# Patient Record
Sex: FEMALE | Race: BLACK OR AFRICAN AMERICAN | Employment: UNEMPLOYED | URBAN - METROPOLITAN AREA
[De-identification: names, ages, dates, MRNs, and addresses within clinical notes are randomized per-mention and may not be internally consistent; named-entity substitution may affect disease eponyms.]

---

## 2017-02-10 ENCOUNTER — HOSPITAL ENCOUNTER (OUTPATIENT)
Dept: PREADMISSION TESTING | Age: 42
Discharge: HOME | End: 2017-02-10
Attending: ORTHOPAEDIC SURGERY
Payer: MEDICAID

## 2017-02-10 DIAGNOSIS — Z01.812 BLOOD TESTS PRIOR TO TREATMENT OR PROCEDURE: ICD-10-CM

## 2017-02-10 LAB
ALBUMIN SERPL BCP-MCNC: 3.5 G/DL (ref 3.4–5)
ALBUMIN/GLOB SERPL: 1.1 {RATIO} (ref 0.8–1.7)
ALP SERPL-CCNC: 44 U/L (ref 45–117)
ALT SERPL-CCNC: 14 U/L (ref 13–56)
ANION GAP BLD CALC-SCNC: 7 MMOL/L (ref 3–18)
AST SERPL W P-5'-P-CCNC: 8 U/L (ref 15–37)
BASOPHILS # BLD AUTO: 0 K/UL (ref 0–0.06)
BASOPHILS # BLD: 0 % (ref 0–2)
BILIRUB SERPL-MCNC: 0.4 MG/DL (ref 0.2–1)
BUN SERPL-MCNC: 6 MG/DL (ref 7–18)
BUN/CREAT SERPL: 8 (ref 12–20)
CALCIUM SERPL-MCNC: 8.8 MG/DL (ref 8.5–10.1)
CHLORIDE SERPL-SCNC: 109 MMOL/L (ref 100–108)
CO2 SERPL-SCNC: 29 MMOL/L (ref 21–32)
CREAT SERPL-MCNC: 0.72 MG/DL (ref 0.6–1.3)
DIFFERENTIAL METHOD BLD: ABNORMAL
EOSINOPHIL # BLD: 0.1 K/UL (ref 0–0.4)
EOSINOPHIL NFR BLD: 1 % (ref 0–5)
ERYTHROCYTE [DISTWIDTH] IN BLOOD BY AUTOMATED COUNT: 14.6 % (ref 11.6–14.5)
GLOBULIN SER CALC-MCNC: 3.2 G/DL (ref 2–4)
GLUCOSE SERPL-MCNC: 68 MG/DL (ref 74–99)
HCT VFR BLD AUTO: 39.3 % (ref 35–45)
HGB BLD-MCNC: 12.3 G/DL (ref 12–16)
LYMPHOCYTES # BLD AUTO: 37 % (ref 21–52)
LYMPHOCYTES # BLD: 1.9 K/UL (ref 0.9–3.6)
MCH RBC QN AUTO: 28.5 PG (ref 24–34)
MCHC RBC AUTO-ENTMCNC: 31.3 G/DL (ref 31–37)
MCV RBC AUTO: 91.2 FL (ref 74–97)
MONOCYTES # BLD: 0.4 K/UL (ref 0.05–1.2)
MONOCYTES NFR BLD AUTO: 8 % (ref 3–10)
NEUTS SEG # BLD: 2.7 K/UL (ref 1.8–8)
NEUTS SEG NFR BLD AUTO: 54 % (ref 40–73)
PLATELET # BLD AUTO: 237 K/UL (ref 135–420)
PMV BLD AUTO: 12.1 FL (ref 9.2–11.8)
POTASSIUM SERPL-SCNC: 4.2 MMOL/L (ref 3.5–5.5)
PROT SERPL-MCNC: 6.7 G/DL (ref 6.4–8.2)
RBC # BLD AUTO: 4.31 M/UL (ref 4.2–5.3)
SODIUM SERPL-SCNC: 145 MMOL/L (ref 136–145)
WBC # BLD AUTO: 5 K/UL (ref 4.6–13.2)

## 2017-02-10 PROCEDURE — 80053 COMPREHEN METABOLIC PANEL: CPT | Performed by: ORTHOPAEDIC SURGERY

## 2017-02-10 PROCEDURE — 36415 COLL VENOUS BLD VENIPUNCTURE: CPT | Performed by: ORTHOPAEDIC SURGERY

## 2017-02-10 PROCEDURE — 85025 COMPLETE CBC W/AUTO DIFF WBC: CPT | Performed by: ORTHOPAEDIC SURGERY

## 2017-02-21 NOTE — H&P
[address] [facility] [address]  History and Physical Exam    Patient: Brian Liang MRN: [id number]  SSN: [id number]    YOB: 1975  Age: 39 y.o. Sex: female      Subjective:      Chief Complaint: right knee pain    History of Present Illness:  Patient complains of right knee pain and difficulty ambulating. Patient has a right knee replacement that was done by Dr. Anirudh Torres in 2016. Following the knee replacement she has an injury where she hit a car door. Since the injury she has experienced reproducible clicking in her knee. A CT scan and ultrasound showed a partial tear of her quad tendon. Physical therapy has offered no relief. Past Medical History   Diagnosis Date    Arthritis      Past Surgical History   Procedure Laterality Date    Hx orthopaedic  2016     right knee replacment    Hx orthopaedic       multiple right knee arthroscopy    Hx orthopaedic       acl repair-right    Hx gyn        X 2    Hx gastric bypass  2014     gastric sleeve    Hx hernia repair  2015     hernia repair     Social History     Occupational History    Not on file. Social History Main Topics    Smoking status: Current Every Day Smoker     Packs/day: 0.50    Smokeless tobacco: Not on file      Comment: Instructed not to smoke 24 hours prior to surgry    Alcohol use 0.6 oz/week     1 Shots of liquor per week      Comment: rarely    Drug use: No    Sexual activity: Not on file     Prior to Admission medications    Medication Sig Start Date End Date Taking? Authorizing Provider   cyanocobalamin (VITAMIN B-12) 1,000 mcg sublingual tablet Take 1,000 mcg by mouth daily. Indications: PREVENTION OF VITAMIN B12 DEFICIENCY    Historical Provider   naproxen (NAPROSYN) 500 mg tablet Take 500 mg by mouth two (2) times daily as needed. Indications: Pain    Historical Provider   medroxyPROGESTERone (PROVERA) 10 mg tablet Take 30 mg by mouth daily.  Indications: ABNORMAL UTERINE BLEEDING DUE TO HORMONAL IMBALANCE    Historical Provider   POTASSIUM CHLORIDE SR 10 MEQ TAB Take 1 Tab by mouth daily. Historical Provider       Allergies: Allergies   Allergen Reactions    Latex Other (comments)     Condom, latex glove causes yeast infection        Review of Systems:  A comprehensive review of systems was negative. Objective:       Physical Exam:  HEENT: Normocephalic, atraumatic  Lungs:  Clear to auscultation  Heart:   Regular rate and rhythm  Abdomen: Soft  Extremities:  Pain and click noted with range of motion of the right knee at the superior lateral aspect of the patella. No subluxation of the patella noted. Neurological: Grossly neurovascularly intact    Assessment:      Synovitis of right total knee. Plan:       The patient has failed previous efforts of conservative management to include non-steroidal anti-inflammatory medications and viscosupplementation. Due to the fact that conservative efforts failed, the patient became a candidate for surgical intervention. Proceed with scheduled right knee arthroscopic synovectomy and scar tissue debridement. The various methods of treatment have been discussed with the patient and family. After consideration of risks, benefits, and other options for treatment, the patient has consented to surgical interventions. Questions were answered and preoperative teaching was done by Dr. Lizet Akins. Signed By: Jet Sy PA-C     February 21, 2017    Date of Surgery Update:  Erika Bautista was seen and examined. History and physical has been reviewed. The patient has been examined. There have been no significant clinical changes since the completion of the originally dated History and Physical.  Patient identified by surgeon; surgical site was confirmed by patient and surgeon.     Signed By: Radha Ace MD     February 22, 2017 10:59 AM

## 2017-02-22 ENCOUNTER — HOSPITAL ENCOUNTER (OUTPATIENT)
Age: 42
Setting detail: OUTPATIENT SURGERY
Discharge: HOME | End: 2017-02-22
Attending: ORTHOPAEDIC SURGERY | Admitting: ORTHOPAEDIC SURGERY
Payer: MEDICAID

## 2017-02-22 ENCOUNTER — ANESTHESIA EVENT (OUTPATIENT)
Dept: SURGERY | Age: 42
End: 2017-02-22
Payer: MEDICAID

## 2017-02-22 ENCOUNTER — ANESTHESIA (OUTPATIENT)
Dept: SURGERY | Age: 42
End: 2017-02-22
Payer: MEDICAID

## 2017-02-22 VITALS
BODY MASS INDEX: 26.4 KG/M2 | RESPIRATION RATE: 16 BRPM | WEIGHT: 149 LBS | SYSTOLIC BLOOD PRESSURE: 134 MMHG | TEMPERATURE: 98.5 F | DIASTOLIC BLOOD PRESSURE: 76 MMHG | HEART RATE: 53 BPM | OXYGEN SATURATION: 100 % | HEIGHT: 63 IN

## 2017-02-22 LAB — HCG SERPL QL: NEGATIVE

## 2017-02-22 PROCEDURE — 76060000033 HC ANESTHESIA 1 TO 1.5 HR: Performed by: ORTHOPAEDIC SURGERY

## 2017-02-22 PROCEDURE — 74011250636 HC RX REV CODE- 250/636

## 2017-02-22 PROCEDURE — 76210000021 HC REC RM PH II 0.5 TO 1 HR: Performed by: ORTHOPAEDIC SURGERY

## 2017-02-22 PROCEDURE — 76210000006 HC OR PH I REC 0.5 TO 1 HR: Performed by: ORTHOPAEDIC SURGERY

## 2017-02-22 PROCEDURE — 76010000149 HC OR TIME 1 TO 1.5 HR: Performed by: ORTHOPAEDIC SURGERY

## 2017-02-22 PROCEDURE — 77030018846 HC SOL IRR STRL H20 ICUM -A: Performed by: ORTHOPAEDIC SURGERY

## 2017-02-22 PROCEDURE — 77030020754 HC CUF TRNQT 2BLA STRY -B: Performed by: ORTHOPAEDIC SURGERY

## 2017-02-22 PROCEDURE — 77030020782 HC GWN BAIR PAWS FLX 3M -B: Performed by: ORTHOPAEDIC SURGERY

## 2017-02-22 PROCEDURE — 77030018836 HC SOL IRR NACL ICUM -A: Performed by: ORTHOPAEDIC SURGERY

## 2017-02-22 PROCEDURE — 84703 CHORIONIC GONADOTROPIN ASSAY: CPT | Performed by: ORTHOPAEDIC SURGERY

## 2017-02-22 PROCEDURE — 74011000250 HC RX REV CODE- 250: Performed by: ORTHOPAEDIC SURGERY

## 2017-02-22 PROCEDURE — 77030006884 HC BLD SHV INCIS S&N -B: Performed by: ORTHOPAEDIC SURGERY

## 2017-02-22 PROCEDURE — 36415 COLL VENOUS BLD VENIPUNCTURE: CPT | Performed by: ORTHOPAEDIC SURGERY

## 2017-02-22 PROCEDURE — 74011250636 HC RX REV CODE- 250/636: Performed by: ORTHOPAEDIC SURGERY

## 2017-02-22 PROCEDURE — 77030002916 HC SUT ETHLN J&J -A: Performed by: ORTHOPAEDIC SURGERY

## 2017-02-22 PROCEDURE — 74011000250 HC RX REV CODE- 250

## 2017-02-22 PROCEDURE — 77030018835 HC SOL IRR LR ICUM -A: Performed by: ORTHOPAEDIC SURGERY

## 2017-02-22 PROCEDURE — 77030034478 HC TU IRR ARTHRO PT ARTH -B: Performed by: ORTHOPAEDIC SURGERY

## 2017-02-22 PROCEDURE — 77030022877 HC TU IRR ARTHRO PMP ARTH -B: Performed by: ORTHOPAEDIC SURGERY

## 2017-02-22 RX ORDER — FENTANYL CITRATE 50 UG/ML
INJECTION, SOLUTION INTRAMUSCULAR; INTRAVENOUS AS NEEDED
Status: DISCONTINUED | OUTPATIENT
Start: 2017-02-22 | End: 2017-02-22 | Stop reason: HOSPADM

## 2017-02-22 RX ORDER — KETOROLAC TROMETHAMINE 30 MG/ML
INJECTION, SOLUTION INTRAMUSCULAR; INTRAVENOUS AS NEEDED
Status: DISCONTINUED | OUTPATIENT
Start: 2017-02-22 | End: 2017-02-22 | Stop reason: HOSPADM

## 2017-02-22 RX ORDER — GLYCOPYRROLATE 0.2 MG/ML
INJECTION INTRAMUSCULAR; INTRAVENOUS AS NEEDED
Status: DISCONTINUED | OUTPATIENT
Start: 2017-02-22 | End: 2017-02-22 | Stop reason: HOSPADM

## 2017-02-22 RX ORDER — PROPOFOL 10 MG/ML
INJECTION, EMULSION INTRAVENOUS AS NEEDED
Status: DISCONTINUED | OUTPATIENT
Start: 2017-02-22 | End: 2017-02-22 | Stop reason: HOSPADM

## 2017-02-22 RX ORDER — MIDAZOLAM HYDROCHLORIDE 1 MG/ML
INJECTION, SOLUTION INTRAMUSCULAR; INTRAVENOUS AS NEEDED
Status: DISCONTINUED | OUTPATIENT
Start: 2017-02-22 | End: 2017-02-22 | Stop reason: HOSPADM

## 2017-02-22 RX ORDER — CEFAZOLIN SODIUM 2 G/50ML
2 SOLUTION INTRAVENOUS ONCE
Status: COMPLETED | OUTPATIENT
Start: 2017-02-22 | End: 2017-02-22

## 2017-02-22 RX ORDER — FENTANYL CITRATE 50 UG/ML
25 INJECTION, SOLUTION INTRAMUSCULAR; INTRAVENOUS
Status: DISCONTINUED | OUTPATIENT
Start: 2017-02-22 | End: 2017-02-22 | Stop reason: HOSPADM

## 2017-02-22 RX ORDER — ROCURONIUM BROMIDE 10 MG/ML
INJECTION, SOLUTION INTRAVENOUS AS NEEDED
Status: DISCONTINUED | OUTPATIENT
Start: 2017-02-22 | End: 2017-02-22 | Stop reason: HOSPADM

## 2017-02-22 RX ORDER — RANITIDINE 150 MG/1
150 TABLET, FILM COATED ORAL 2 TIMES DAILY
COMMUNITY

## 2017-02-22 RX ORDER — SODIUM CHLORIDE, SODIUM LACTATE, POTASSIUM CHLORIDE, CALCIUM CHLORIDE 600; 310; 30; 20 MG/100ML; MG/100ML; MG/100ML; MG/100ML
100 INJECTION, SOLUTION INTRAVENOUS CONTINUOUS
Status: DISCONTINUED | OUTPATIENT
Start: 2017-02-22 | End: 2017-02-22 | Stop reason: HOSPADM

## 2017-02-22 RX ORDER — SODIUM CHLORIDE, SODIUM LACTATE, POTASSIUM CHLORIDE, CALCIUM CHLORIDE 600; 310; 30; 20 MG/100ML; MG/100ML; MG/100ML; MG/100ML
125 INJECTION, SOLUTION INTRAVENOUS CONTINUOUS
Status: DISCONTINUED | OUTPATIENT
Start: 2017-02-22 | End: 2017-02-22 | Stop reason: HOSPADM

## 2017-02-22 RX ORDER — NEOSTIGMINE METHYLSULFATE 1 MG/ML
INJECTION INTRAVENOUS AS NEEDED
Status: DISCONTINUED | OUTPATIENT
Start: 2017-02-22 | End: 2017-02-22 | Stop reason: HOSPADM

## 2017-02-22 RX ORDER — MAGNESIUM SULFATE 100 %
4 CRYSTALS MISCELLANEOUS AS NEEDED
Status: DISCONTINUED | OUTPATIENT
Start: 2017-02-22 | End: 2017-02-22 | Stop reason: HOSPADM

## 2017-02-22 RX ORDER — DEXTROSE 50 % IN WATER (D50W) INTRAVENOUS SYRINGE
25-50 AS NEEDED
Status: DISCONTINUED | OUTPATIENT
Start: 2017-02-22 | End: 2017-02-22 | Stop reason: HOSPADM

## 2017-02-22 RX ORDER — NALOXONE HYDROCHLORIDE 0.4 MG/ML
0.2 INJECTION, SOLUTION INTRAMUSCULAR; INTRAVENOUS; SUBCUTANEOUS AS NEEDED
Status: DISCONTINUED | OUTPATIENT
Start: 2017-02-22 | End: 2017-02-22 | Stop reason: HOSPADM

## 2017-02-22 RX ORDER — LIDOCAINE HYDROCHLORIDE 20 MG/ML
INJECTION, SOLUTION EPIDURAL; INFILTRATION; INTRACAUDAL; PERINEURAL AS NEEDED
Status: DISCONTINUED | OUTPATIENT
Start: 2017-02-22 | End: 2017-02-22 | Stop reason: HOSPADM

## 2017-02-22 RX ORDER — SODIUM CHLORIDE 0.9 % (FLUSH) 0.9 %
5-10 SYRINGE (ML) INJECTION AS NEEDED
Status: DISCONTINUED | OUTPATIENT
Start: 2017-02-22 | End: 2017-02-22 | Stop reason: HOSPADM

## 2017-02-22 RX ORDER — METOCLOPRAMIDE HYDROCHLORIDE 5 MG/ML
INJECTION INTRAMUSCULAR; INTRAVENOUS AS NEEDED
Status: DISCONTINUED | OUTPATIENT
Start: 2017-02-22 | End: 2017-02-22 | Stop reason: HOSPADM

## 2017-02-22 RX ORDER — SUCCINYLCHOLINE CHLORIDE 20 MG/ML
INJECTION INTRAMUSCULAR; INTRAVENOUS AS NEEDED
Status: DISCONTINUED | OUTPATIENT
Start: 2017-02-22 | End: 2017-02-22 | Stop reason: HOSPADM

## 2017-02-22 RX ADMIN — FENTANYL CITRATE 50 MCG: 50 INJECTION, SOLUTION INTRAMUSCULAR; INTRAVENOUS at 11:14

## 2017-02-22 RX ADMIN — GLYCOPYRROLATE 0.2 MG: 0.2 INJECTION INTRAMUSCULAR; INTRAVENOUS at 11:03

## 2017-02-22 RX ADMIN — ROCURONIUM BROMIDE 25 MG: 10 INJECTION, SOLUTION INTRAVENOUS at 11:23

## 2017-02-22 RX ADMIN — CEFAZOLIN SODIUM 2 G: 2 SOLUTION INTRAVENOUS at 11:03

## 2017-02-22 RX ADMIN — FENTANYL CITRATE 50 MCG: 50 INJECTION, SOLUTION INTRAMUSCULAR; INTRAVENOUS at 11:52

## 2017-02-22 RX ADMIN — FENTANYL CITRATE 50 MCG: 50 INJECTION, SOLUTION INTRAMUSCULAR; INTRAVENOUS at 11:03

## 2017-02-22 RX ADMIN — PROPOFOL 150 MG: 10 INJECTION, EMULSION INTRAVENOUS at 11:22

## 2017-02-22 RX ADMIN — NEOSTIGMINE METHYLSULFATE 2 MG: 1 INJECTION INTRAVENOUS at 12:03

## 2017-02-22 RX ADMIN — PROPOFOL 200 MG: 10 INJECTION, EMULSION INTRAVENOUS at 11:14

## 2017-02-22 RX ADMIN — SODIUM CHLORIDE, SODIUM LACTATE, POTASSIUM CHLORIDE, AND CALCIUM CHLORIDE: 600; 310; 30; 20 INJECTION, SOLUTION INTRAVENOUS at 11:49

## 2017-02-22 RX ADMIN — GLYCOPYRROLATE 0.3 MG: 0.2 INJECTION INTRAMUSCULAR; INTRAVENOUS at 12:03

## 2017-02-22 RX ADMIN — METOCLOPRAMIDE HYDROCHLORIDE 10 MG: 5 INJECTION INTRAMUSCULAR; INTRAVENOUS at 11:43

## 2017-02-22 RX ADMIN — FENTANYL CITRATE 50 MCG: 50 INJECTION, SOLUTION INTRAMUSCULAR; INTRAVENOUS at 12:12

## 2017-02-22 RX ADMIN — MIDAZOLAM HYDROCHLORIDE 2 MG: 1 INJECTION, SOLUTION INTRAMUSCULAR; INTRAVENOUS at 11:03

## 2017-02-22 RX ADMIN — SODIUM CHLORIDE, SODIUM LACTATE, POTASSIUM CHLORIDE, AND CALCIUM CHLORIDE 125 ML/HR: 600; 310; 30; 20 INJECTION, SOLUTION INTRAVENOUS at 09:15

## 2017-02-22 RX ADMIN — LIDOCAINE HYDROCHLORIDE 60 MG: 20 INJECTION, SOLUTION EPIDURAL; INFILTRATION; INTRACAUDAL; PERINEURAL at 11:14

## 2017-02-22 RX ADMIN — SUCCINYLCHOLINE CHLORIDE 20 MG: 20 INJECTION INTRAMUSCULAR; INTRAVENOUS at 11:21

## 2017-02-22 RX ADMIN — KETOROLAC TROMETHAMINE 30 MG: 30 INJECTION, SOLUTION INTRAMUSCULAR; INTRAVENOUS at 12:00

## 2017-02-22 NOTE — ANESTHESIA POSTPROCEDURE EVALUATION
.  Post-Anesthesia Evaluation & Assessment    Visit Vitals    /78    Pulse (!) 45    Temp 36.9 °C (98.4 °F)    Resp 17    Ht 5' 2.5\" (1.588 m)    Wt 67.6 kg (149 lb)    SpO2 98%    BMI 26.82 kg/m2       Nausea/Vomiting: no nausea    Post-operative hydration adequate.     Pain score (VAS): 2    Mental status & Level of consciousness: alert and oriented x 3    Neurological status: moves all extremities, sensation grossly intact    Pulmonary status: airway patent, no supplemental oxygen required    Complications related to anesthesia: none    Additional comments:

## 2017-02-22 NOTE — ANESTHESIA PREPROCEDURE EVALUATION
Anesthetic History   No history of anesthetic complications            Review of Systems / Medical History  Patient summary reviewed, nursing notes reviewed and pertinent labs reviewed    Pulmonary          Smoker      Comments: Pt counseled on smoking; did not smoke this am   Neuro/Psych   Within defined limits           Cardiovascular  Within defined limits                     GI/Hepatic/Renal     GERD: well controlled           Endo/Other        Arthritis     Other Findings              Physical Exam    Airway  Mallampati: I  TM Distance: 4 - 6 cm  Neck ROM: normal range of motion   Mouth opening: Normal     Cardiovascular    Rhythm: regular  Rate: normal         Dental    Dentition: Caps/crowns     Pulmonary  Breath sounds clear to auscultation               Abdominal  GI exam deferred       Other Findings            Anesthetic Plan    ASA: 2  Anesthesia type: general          Induction: Intravenous  Anesthetic plan and risks discussed with: Patient

## 2017-02-22 NOTE — IP AVS SNAPSHOT
Lashae Omalley 
 
 
 [address] [facility] [address] 
[phone] Patient: Shayna Perez MRN: [id number] VZN:[id number] You are allergic to the following Allergen Reactions Latex Other (comments) Condom, latex glove causes yeast infection Recent Documentation Height  
  
  
  
  
  
 1.588 m Emergency Contacts Name Discharge Info Relation Home Work Mobile Ashley Borja DISCHARGE CAREGIVER [3] Life Partner [7] [phone] About your hospitalization You were admitted on:  February 22, 2017 [facility] You were discharged on:  February 22, 2017 Unit phone number:  [phone] Why you were hospitalized Your primary diagnosis was:  Not on File Providers Seen During Your Hospitalizations Provider Role Specialty Primary office phone Vero Goldsmith MD Attending Provider Orthopedic Surgery [phone] Your Primary Care Physician (PCP) Primary Care Physician Office Phone Office Fax Tesha Vacakeley [phone] Follow-up Information Follow up With Details Comments Contact Info Sharmila Landaverde MD   [address] [place] [address] 
[phone] Current Discharge Medication List  
  
CONTINUE these medications which have NOT CHANGED Dose & Instructions Dispensing Information Comments Morning Noon Evening Bedtime  
 medroxyPROGESTERone 10 mg tablet Commonly known as:  PROVERA Your next dose is: Today, Tomorrow Other:  _________ Dose:  30 mg Take 30 mg by mouth daily. Indications: ABNORMAL UTERINE BLEEDING DUE TO HORMONAL IMBALANCE Refills:  0  
     
   
   
   
  
 naproxen 500 mg tablet Commonly known as:  NAPROSYN Your next dose is: Today, Tomorrow Other:  _________  Dose:  500 mg  
 Take 500 mg by mouth two (2) times daily as needed. Indications: Pain Refills:  0 POTASSIUM CHLORIDE SR 10 MEQ TAB Your next dose is: Today, Tomorrow Other:  _________ Dose:  1 Tab Take 1 Tab by mouth daily. Refills:  0  
     
   
   
   
  
 VITAMIN B-12 1,000 mcg sublingual tablet Generic drug:  cyanocobalamin Your next dose is: Today, Tomorrow Other:  _________ Dose:  1000 mcg Take 1,000 mcg by mouth daily. Indications: PREVENTION OF VITAMIN B12 DEFICIENCY Refills:  0  
     
   
   
   
  
 ZANTAC 150 mg tablet Generic drug:  raNITIdine Your next dose is: Today, Tomorrow Other:  _________ Dose:  150 mg Take 150 mg by mouth two (2) times a day. Refills:  0 Discharge Instructions DISCHARGE SUMMARY from Nurse The following personal items are in your possession at time of discharge: 
 
Dental Appliances: None Visual Aid: None Home Medications: None Jewelry: None Clothing: Pants, Undergarments, Footwear, Shirt, Socks (Placed in locker 14) Other Valuables: Cell Phone (Given to Brandon ) PATIENT INSTRUCTIONS: 
 
 
F-face looks uneven A-arms unable to move or move unevenly S-speech slurred or non-existent T-time-call 911 as soon as signs and symptoms begin-DO NOT go Back to bed or wait to see if you get better-TIME IS BRAIN. Warning Signs of HEART ATTACK Call 911 if you have these symptoms: 
? Chest discomfort.  Most heart attacks involve discomfort in the center of the chest that lasts more than a few minutes, or that goes away and comes back. It can feel like uncomfortable pressure, squeezing, fullness, or pain. ? Discomfort in other areas of the upper body. Symptoms can include pain or discomfort in one or both arms, the back, neck, jaw, or stomach. ? Shortness of breath with or without chest discomfort. ? Other signs may include breaking out in a cold sweat, nausea, or lightheadedness. Don't wait more than five minutes to call [address]! Fast action can save your life. Calling 911 is almost always the fastest way to get lifesaving treatment. Emergency Medical Services staff can begin treatment when they arrive  up to an hour sooner than if someone gets to the hospital by car. The discharge information has been reviewed with the patient and caregiver. The patient and caregiver verbalized understanding. Discharge medications reviewed with the patient and caregiver and appropriate educational materials and side effects teaching were provided. Knee Athroscopic Surgery Discharge Instructions Dr. Atr Mc Please take the time to review the following instructions before you leave the hospital and use them as guidelines during your recovery from surgery. If you have any questions you may contact my office at [phone]. Wound Care / Dressing Changes: You may change your dressing as needed. Beginning the day after you are discharged from the hospital you should change your dressing daily. A big, bulky dressing isn't necessary as long as there isn't any drainage from the incisions. You can put a band-aid over each incision and wear an ACE bandage as needed for comfort and swelling. It isn't necessary to apply antibiotic ointment to your incisions. Sutures will be removed at your one week post-op visit. Mario Lied / Bathing: You may only shower. You may shower if there is no drainage from your incisions. Your dressing may be removed for showering.  You may get your incisions wet in the shower. Don't vigorously scrub the area where your incisions are. Apply a clean, dry dressing after drying off the area of your incisions. Don't take a tub bath, get in a swimming pool or Jacuzzi until the incisions are completely healed. Do not soak your incisions under water. Weight Bearing Status / Braces:  
 
Weight bear as tolerated. Use crutches for a few days if necessary. Please normalize your activities as tolerated. You are able to bend your knee as much as possible. Perform straight leg raises 10 every hour. It is important to get up and walk around for 3-5 minutes every hour while you are awake. This will decrease the risk of blood clot. Ice / Elevation:  
 
Continue ice and elevation as needed for pain and swelling. Diet:  
 
You may advance to your prehospital diet as tolerated. If you have excessive nausea or vomitting call your doctor's office. Medication:  
 
1. You will be given a prescription for pain medication when you are discharged for the hospital. Take the medication as needed according to the directions on the prescription bottle. Possible side effects ofthe medication include dizziness, headache, nausea, vomiting, constipation and urinary retention. If you experience any ofthese side effects call the office so that we can assist you in relieving them. Discontinue the use ofthe pain medication if you develop itching, rash, shortness ofbreath or difficulties swallowing. If these symptoms become severe or aren't relieved by discontinuing the medication you should seek immediate medical attention. Refills of pain medication are authorized during office hours only. (8am - 5pm Monday thru Friday) 2. Please take over the counter stool softeners while you are taking narcotic pain                  medication. Pain medications can cause constipation.  Stool softeners, warm  
      prune juice and increasing your water and fiber intake can help prevent constipation. Do not take laxatives. 3.  You may resume the medication you were taking prior to your surgery. Pain                  
     medication may change the effects of any antidepressant medication you are taking. If you have any questions about possible interactions between your regular  
     medications and the pain medication you should consult the physician who       
     prescribes your regular medications. Follow Up Appointment:  
 
Please call [phone] for a follow appointment with Dr. Jacobo Mendez 7 - 10 days from the time of your surgery. Please let our  know you are scheduling a post-op appointment. Physical Therapy:  
 
Physical therapy will be discussed with you at your first follow-up appointment with Dr. Jacobo Mendez . You don't need to begin physical therapy prior to that visit. Signs and Symptoms to be Aware of: If any of the following signs and symptoms occur, you should contact Dr. Jacobo Mendez 's office. Please be advised ifa problem arises which you feel requires immediate medical attention or you are unable to contact Dr. Jacobo Mendez 's office you should seek immediate medical attention at the emergency department or other health care facility you have access to. Signs and symptoms to watch for include: 1. A sudden increase in swelling and l or redness or warmth at the area your surgery was performed which isn't relieved by rest, ice and elevation. 2. Oral temperature greater than 101.5 degrees for 12 hours or more which isn't relieved by an increase in fluid intake and taking two Tylenol every 4-6 hours. 3. Excessive drainage from your incisions, or drainage which hasn't stopped by 72 hours after your surgery despite applying a compressive dressing, ice and elevation. 4. Calf pain, tenderness, redness or swelling which isn't relieved with rest and elevation.   
5. Fever, chills, shortness ofbreath, chest pain, nausea, vomiting or other signs and symptoms which are of concern to you. Other Instructions: 
 
Patient armband removed and shredded Discharge Orders None Introducing [facility]! Nikky Gutierrez introduces [employer] patient portal. Now you can access parts of your medical record, email your doctor's office, and request medication refills online. 1. In your internet browser, go to [email or web address] 2. Click on the First Time User? Click Here link in the Sign In box. You will see the New Member Sign Up page. 3. Enter your [employer] Access Code exactly as it appears below. You will not need to use this code after youve completed the sign-up process. If you do not sign up before the expiration date, you must request a new code. · [employer] Access Code: [email or web address] Expires: 5/11/2017  8:43 AM 
 
4. Enter the last four digits of your Social Security Number (xxxx) and Date of Birth (mm/dd/yyyy) as indicated and click Submit. You will be taken to the next sign-up page. 5. Create a [employer] ID. This will be your [employer] login ID and cannot be changed, so think of one that is secure and easy to remember. 6. Create a [employer] password. You can change your password at any time. 7. Enter your Password Reset Question and Answer. This can be used at a later time if you forget your password. 8. Enter your e-mail address. You will receive e-mail notification when new information is available in [address] 9. Click Sign Up. You can now view and download portions of your medical record. 10. Click the Download Summary menu link to download a portable copy of your medical information. If you have questions, please visit the Frequently Asked Questions section of the [employer] website. Remember, [employer] is NOT to be used for urgent needs. For medical emergencies, dial 911. Now available from your iPhone and Android! General Information Please provide this summary of care documentation to your next provider. Patient Signature:  ____________________________________________________________ Date:  ____________________________________________________________  
  
Paulene Greener Provider Signature:  ____________________________________________________________ Date:  ____________________________________________________________

## 2017-02-22 NOTE — DISCHARGE INSTRUCTIONS
DISCHARGE SUMMARY from Nurse    The following personal items are in your possession at time of discharge:    Dental Appliances: None  Visual Aid: None     Home Medications: None  Jewelry: None  Clothing: Pants, Undergarments, Footwear, Shirt, Socks (Placed in locker 15)  Other Valuables: Cell Phone (Given to Brandon )             PATIENT INSTRUCTIONS:    After general anesthesia or intravenous sedation, for 24 hours or while taking prescription Narcotics:  · Limit your activities  · Do not drive and operate hazardous machinery  · Do not make important personal or business decisions  · Do  not drink alcoholic beverages  · If you have not urinated within 8 hours after discharge, please contact your surgeon on call. Report the following to your surgeon:  · Excessive pain, swelling, redness or odor of or around the surgical area  · Temperature over 100.5  · Nausea and vomiting lasting longer than 4 hours or if unable to take medications  · Any signs of decreased circulation or nerve impairment to extremity: change in color, persistent  numbness, tingling, coldness or increase pain  · Any questions        What to do at Home:  Regular diet  Return to office in 10 days call for appt    If you experience any of the following symptoms heavy bleeding, fevers, severe pain, circulation changes, please follow up with dr forbes      *  Please give a list of your current medications to your Primary Care Provider. *  Please update this list whenever your medications are discontinued, doses are      changed, or new medications (including over-the-counter products) are added. *  Please carry medication information at all times in case of emergency situations. These are general instructions for a healthy lifestyle:    No smoking/ No tobacco products/ Avoid exposure to second hand smoke    Surgeon General's Warning:  Quitting smoking now greatly reduces serious risk to your health.     Obesity, smoking, and sedentary lifestyle greatly increases your risk for illness    A healthy diet, regular physical exercise & weight monitoring are important for maintaining a healthy lifestyle    You may be retaining fluid if you have a history of heart failure or if you experience any of the following symptoms:  Weight gain of 3 pounds or more overnight or 5 pounds in a week, increased swelling in our hands or feet or shortness of breath while lying flat in bed. Please call your doctor as soon as you notice any of these symptoms; do not wait until your next office visit. Recognize signs and symptoms of STROKE:    F-face looks uneven    A-arms unable to move or move unevenly    S-speech slurred or non-existent    T-time-call 911 as soon as signs and symptoms begin-DO NOT go       Back to bed or wait to see if you get better-TIME IS BRAIN. Warning Signs of HEART ATTACK     Call 911 if you have these symptoms:   Chest discomfort. Most heart attacks involve discomfort in the center of the chest that lasts more than a few minutes, or that goes away and comes back. It can feel like uncomfortable pressure, squeezing, fullness, or pain.  Discomfort in other areas of the upper body. Symptoms can include pain or discomfort in one or both arms, the back, neck, jaw, or stomach.  Shortness of breath with or without chest discomfort.  Other signs may include breaking out in a cold sweat, nausea, or lightheadedness. Don't wait more than five minutes to call 911 - MINUTES MATTER! Fast action can save your life. Calling 911 is almost always the fastest way to get lifesaving treatment. Emergency Medical Services staff can begin treatment when they arrive -- up to an hour sooner than if someone gets to the hospital by car. The discharge information has been reviewed with the patient and caregiver. The patient and caregiver verbalized understanding.     Discharge medications reviewed with the patient and caregiver and appropriate educational materials and side effects teaching were provided. Knee Athroscopic Surgery Discharge Instructions   Dr. Vero Goldsmith    Please take the time to review the following instructions before you leave the hospital and use them as guidelines during your recovery from surgery. If you have any questions you may contact my office at [phone]. Wound Care / Dressing Changes: You may change your dressing as needed. Beginning the day after you are discharged from the hospital you should change your dressing daily. A big, bulky dressing isn't necessary as long as there isn't any drainage from the incisions. You can put a band-aid over each incision and wear an ACE bandage as needed for comfort and swelling. It isn't necessary to apply antibiotic ointment to your incisions. Sutures will be removed at your one week post-op visit. Kelvin Walter / Bathing: You may only shower. You may shower if there is no drainage from your incisions. Your dressing may be removed for showering. You may get your incisions wet in the shower. Don't vigorously scrub the area where your incisions are. Apply a clean, dry dressing after drying off the area of your incisions. Don't take a tub bath, get in a swimming pool or Jacuzzi until the incisions are completely healed. Do not soak your incisions under water. Weight Bearing Status / Braces:     Weight bear as tolerated. Use crutches for a few days if necessary. Please normalize your activities as tolerated. You are able to bend your knee as much as possible. Perform straight leg raises 10 every hour. It is important to get up and walk around for 3-5 minutes every hour while you are awake. This will decrease the risk of blood clot. Ice / Elevation:     Continue ice and elevation as needed for pain and swelling. Diet:     You may advance to your prehospital diet as tolerated. If you have excessive nausea or vomitting call your doctor's office.      Medication: 1.  You will be given a prescription for pain medication when you are discharged for the hospital. Take the medication as needed according to the directions on the prescription bottle. Possible side effects ofthe medication include dizziness, headache, nausea, vomiting, constipation and urinary retention. If you experience any ofthese side effects call the office so that we can assist you in relieving them. Discontinue the use ofthe pain medication if you develop itching, rash, shortness ofbreath or difficulties swallowing. If these symptoms become severe or aren't relieved by discontinuing the medication you should seek immediate medical attention. Refills of pain medication are authorized during office hours only. (8am - 5pm Monday thru Friday)  2. Please take over the counter stool softeners while you are taking narcotic pain                  medication. Pain medications can cause constipation. Stool softeners, warm         prune juice and increasing your water and fiber intake can help prevent constipation. Do not take laxatives. 3.  You may resume the medication you were taking prior to your surgery. Pain                        medication may change the effects of any antidepressant medication you are taking. If you have any questions about possible interactions between your regular        medications and the pain medication you should consult the physician who             prescribes your regular medications. Follow Up Appointment:     Please call [phone] for a follow appointment with Dr. Lizet Akins 7 - 10 days from the time of your surgery. Please let our  know you are scheduling a post-op appointment. Physical Therapy:     Physical therapy will be discussed with you at your first follow-up appointment with Dr. Lizet Akins . You don't need to begin physical therapy prior to that visit. Signs and Symptoms to be Aware of:      If any of the following signs and symptoms occur, you should contact Dr. Jose Flanagan 's office. Please be advised ifa problem arises which you feel requires immediate medical attention or you are unable to contact Dr. Jose Flanagan 's office you should seek immediate medical attention at the emergency department or other health care facility you have access to. Signs and symptoms to watch for include:     1. A sudden increase in swelling and l or redness or warmth at the area your surgery was performed which isn't relieved by rest, ice and elevation. 2. Oral temperature greater than 101.5 degrees for 12 hours or more which isn't relieved by an increase in fluid intake and taking two Tylenol every 4-6 hours. 3. Excessive drainage from your incisions, or drainage which hasn't stopped by 72 hours after your surgery despite applying a compressive dressing, ice and elevation. 4. Calf pain, tenderness, redness or swelling which isn't relieved with rest and elevation. 5. Fever, chills, shortness ofbreath, chest pain, nausea, vomiting or other signs and symptoms which are of concern to you.      Other Instructions:    Patient armband removed and shredded

## 2017-02-22 NOTE — BRIEF OP NOTE
BRIEF OPERATIVE NOTE    Date of Procedure: 2/22/2017   Preoperative Diagnosis: PRESENCE RIGHT ARTIFICIAL KNEE JOINT,PAIN RIGHT KNEE  Postoperative Diagnosis: RIGHT KNEE SYNOVITITIS, PRESENCE RIGHT ARTIFICIAL KNEE JOINT, PAIN RIGHT KNEE    Procedure(s):  RIGHT KNEE ARTHROSCOPY W/SYNOVECTOMY & SCAR TISSUE DEBRIDEMENT  Surgeon(s) and Role:     * Kenrick Ruiz MD - Primary       Physician Assistant: Camilo Carter PA-C    Surgical Staff:  Circ-1: Eduardo Gallegos RN  Circ-2: Wilder Slade RN  Circ-Relief: Andrew Madsen RN  Physician Assistant: Camilo Carter PA-C  Scrub Tech-1: Ana Rosa Garcia  [facility] Staff: Fred Jason RN  Event Time In   Incision Start 1129   Incision Close 1200     Anesthesia: General   Estimated Blood Loss: minimal  Specimens: * No specimens in log *   Findings: synovitis    Complications: none  Implants: * No implants in log *

## 2017-02-22 NOTE — PERIOP NOTES
TRANSFER - OUT REPORT:    Verbal report given to Wanda Simmons RN(name) on Shayna Perez  being transferred to phase 2(unit) for routine post - op       Report consisted of patients Situation, Background, Assessment and   Recommendations(SBAR). Information from the following report(s) OR Summary and Intake/Output was reviewed with the receiving nurse. Lines:   Peripheral IV 02/22/17 Right Forearm (Active)   Site Assessment Clean, dry, & intact 2/22/2017  1:03 PM   Phlebitis Assessment 0 2/22/2017  1:03 PM   Infiltration Assessment 0 2/22/2017  1:03 PM   Dressing Status Clean, dry, & intact 2/22/2017  1:03 PM   Dressing Type Tape 2/22/2017  1:03 PM   Hub Color/Line Status Infusing 2/22/2017  1:03 PM        Opportunity for questions and clarification was provided.       Patient transported with:   [employer]

## 2017-02-22 NOTE — PERIOP NOTES
Handoff Report from Operating Room to PACU   Report received from [facility], JOSUÉ and Maggie Ibarra RN regarding patient, Ciara Briones . Surgeon(s): Karen Weiss MD and Procedure confirmed with allergies and dressings discussed. Anesthesia type, drugs, patient history, complications, estimated blood loss, vital signs, intake and output, and last pain medication, lines, reversal medications and temperature were reviewed. PACU monitoring initiated. Non-rebreather mask with 10 liters 02 applied. 02Sat 100%. Patient is drowsy, able to Follow commands. Dressing to right knee CDI, PIV #20 g  Right arm, infusing without difficulty. See Doc flowsheet for physical assessment details.    Anjelica Elizondo RN

## 2017-02-23 NOTE — OP NOTES
[address] [place] [address]  OPERATIVE REPORT    Name:  Yadira Herzog  MR#:  [id number]  :  1975  Account #:  [de-identified]  Date of Adm:  2017  Date of Surgery:  2017      ESTIMATED BLOOD LOSS: Minimal.    SPECIMENS REMOVED: None. ANESTHESIA: General.    PREOPERATIVE DIAGNOSIS: Right painful total knee arthroplasty. POSTOPERATIVE DIAGNOSIS: Right painful total knee arthroplasty. PROCEDURE PERFORMED: Right knee arthroscopy with major  synovectomy and scar tissue debridement. BRIEF HISTORY: The patient is a 69-year-old female who previously  underwent a right total knee arthroplasty by Dr. Saturnino Kirkpatrick. She has  continued to have pain and difficulty. She does have a reproducible  click with her kneecap coming from flexion into extension. 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 We did discuss the option of surgical management for an arthroscopy  with scar debridement. She understood the risks and benefits and  elected to proceed. DESCRIPTION OF PROCEDURE: The patient was brought to the  operating suite, properly identified, placed supine upon the operating  table and placed under a general anesthetic. Once an adequate level  of anesthesia was obtained, tourniquet was placed high on her thigh. She was placed in a leg olmedo. She was prepped and draped in the  usual sterile fashion. The tourniquet was not inflated during the  procedure. An inferolateral portal was created in standard fashion. The  arthroscope was advanced into the joint. A complete survey was  performed. The implants themselves were in good condition. No gross  abnormality was noted. There was scar tissue noted over the superior  lateral and inferior aspect of the patellar button. An inferomedial portal  was created in standard fashion. Using the shaver, we resected the  scar tissue so that there was no tissue overhanging the button.  There  was a significant amount of tissue laterally that we debrided back  extensively. Coming into the joint, there was a pseudo medial and  lateral meniscus that was debrided as well. The scar tissue was  removed from the medial aspect of the joint space, the notch around  the patella and then also from the lateral joint space. We basically  performed a major synovectomy, removing all impinging and scar  tissue. Once that was completed, we then turned off the inflow of the  button for the patella tracked nicely within the groove. No further  impingement was noted of the tissues. The knee was drained and  lavaged. The knee was then injected with morphine, Marcaine and  epinephrine. Portal sites were closed with 3-0 nylon. A sterile  compressive dressing was applied. The patient was awakened and  transferred to the recovery room in stable condition. All needle and  sponge counts were reported correct at the end of the case. LANDEN Harris, did assist throughout the entire procedure.         MD KATHLEEN Brooks / Manfred Aggarwal  D:  02/22/2017   17:01  T:  02/22/2017   21:24  Job #:  [id number]

## 2018-06-11 ENCOUNTER — HOSPITAL ENCOUNTER (EMERGENCY)
Age: 43
Discharge: HOME | End: 2018-06-12
Attending: INTERNAL MEDICINE
Payer: SELF-PAY
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
## 2018-06-11 DIAGNOSIS — S80.11XA CONTUSION OF RIGHT LOWER LEG, INITIAL ENCOUNTER: ICD-10-CM

## 2018-06-11 DIAGNOSIS — S16.1XXA STRAIN OF NECK MUSCLE, INITIAL ENCOUNTER: ICD-10-CM

## 2018-06-11 DIAGNOSIS — S46.912A STRAIN OF LEFT SHOULDER, INITIAL ENCOUNTER: ICD-10-CM

## 2018-06-11 PROCEDURE — 99282 EMERGENCY DEPT VISIT SF MDM: CPT

## 2018-06-11 NOTE — LETTER
[facility] [place] 
[facility] 
[address] [id number] [phone] Work/School Note Date: 6/11/2018 To Whom It May concern: 
 
Harshad Snow was seen and treated today in the emergency room by the following provider(s): 
Attending Provider: Gypsy Navas MD 
Physician Assistant: LANDEN Weaver. Harshad Snow may return to work on 6/14/18. Sincerely, Uri Ovalles PA-C

## 2018-06-11 NOTE — LETTER
[facility] 
THE [facility] EMERGENCY DEPT 
[address] [address] 
[phone] Work/School Note Date: 6/11/2018 To Whom It May concern: 
 
Stephanie Chandler was seen and treated today in the emergency room by the following provider(s): 
Attending Provider: Neela Kevin MD 
Physician Assistant: LANDEN Cho. Stephanie Chandler may return to work on 6/14/18. Sincerely, Reyes Elkins PA-C

## 2018-06-12 ENCOUNTER — APPOINTMENT (OUTPATIENT)
Dept: CT IMAGING | Age: 43
End: 2018-06-12
Attending: PHYSICIAN ASSISTANT
Payer: SELF-PAY

## 2018-06-12 ENCOUNTER — APPOINTMENT (OUTPATIENT)
Dept: GENERAL RADIOLOGY | Age: 43
End: 2018-06-12
Attending: PHYSICIAN ASSISTANT
Payer: SELF-PAY

## 2018-06-12 VITALS
SYSTOLIC BLOOD PRESSURE: 154 MMHG | BODY MASS INDEX: 23.57 KG/M2 | WEIGHT: 133 LBS | RESPIRATION RATE: 12 BRPM | HEART RATE: 93 BPM | OXYGEN SATURATION: 99 % | TEMPERATURE: 98.6 F | DIASTOLIC BLOOD PRESSURE: 97 MMHG | HEIGHT: 63 IN

## 2018-06-12 PROCEDURE — 72110 X-RAY EXAM L-2 SPINE 4/>VWS: CPT

## 2018-06-12 PROCEDURE — 73030 X-RAY EXAM OF SHOULDER: CPT

## 2018-06-12 PROCEDURE — 73590 X-RAY EXAM OF LOWER LEG: CPT

## 2018-06-12 PROCEDURE — 72125 CT NECK SPINE W/O DYE: CPT

## 2018-06-12 RX ORDER — METHOCARBAMOL 750 MG/1
750 TABLET, FILM COATED ORAL 4 TIMES DAILY
Qty: 12 TAB | Refills: 0 | Status: SHIPPED | OUTPATIENT
Start: 2018-06-12

## 2018-06-12 RX ORDER — PREDNISONE 10 MG/1
TABLET ORAL
Qty: 21 TAB | Refills: 0 | Status: SHIPPED | OUTPATIENT
Start: 2018-06-12
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
## 2018-06-12 NOTE — ED NOTES
Pt being d/c home with d/c instructions/RXs reviewed/understanding acknowledged. RXs provided to aide her discomfort/pain.

## 2018-06-12 NOTE — DISCHARGE INSTRUCTIONS
Shoulder Pain: Care Instructions  Your Care Instructions    You can hurt your shoulder by using it too much during an activity, such as fishing or baseball. It can also happen as part of the everyday wear and tear of getting older. Shoulder injuries can be slow to heal, but your shoulder should get better with time. Your doctor may recommend a sling to rest your shoulder. If you have injured your shoulder, you may need testing and treatment. Follow-up care is a key part of your treatment and safety. Be sure to make and go to all appointments, and call your doctor if you are having problems. It's also a good idea to know your test results and keep a list of the medicines you take. How can you care for yourself at home? · Take pain medicines exactly as directed. ¨ If the doctor gave you a prescription medicine for pain, take it as prescribed. ¨ If you are not taking a prescription pain medicine, ask your doctor if you can take an over-the-counter medicine. ¨ Do not take two or more pain medicines at the same time unless the doctor told you to. Many pain medicines contain acetaminophen, which is Tylenol. Too much acetaminophen (Tylenol) can be harmful. · If your doctor recommends that you wear a sling, use it as directed. Do not take it off before your doctor tells you to. · Put ice or a cold pack on the sore area for 10 to 20 minutes at a time. Put a thin cloth between the ice and your skin. · If there is no swelling, you can put moist heat, a heating pad, or a warm cloth on your shoulder. Some doctors suggest alternating between hot and cold. · Rest your shoulder for a few days. If your doctor recommends it, you can then begin gentle exercise of the shoulder, but do not lift anything heavy. When should you call for help? Call 911 anytime you think you may need emergency care. For example, call if:  ? · You have chest pain or pressure. This may occur with:  ¨ Sweating.   ¨ Shortness of breath. ¨ Nausea or vomiting. ¨ Pain that spreads from the chest to the neck, jaw, or one or both shoulders or arms. ¨ Dizziness or lightheadedness. ¨ A fast or uneven pulse. After calling 911, chew 1 adult-strength aspirin. Wait for an ambulance. Do not try to drive yourself. ? · Your arm or hand is cool or pale or changes color. ?Call your doctor now or seek immediate medical care if:  ? · You have signs of infection, such as:  ¨ Increased pain, swelling, warmth, or redness in your shoulder. ¨ Red streaks leading from a place on your shoulder. ¨ Pus draining from an area of your shoulder. ¨ Swollen lymph nodes in your neck, armpits, or groin. ¨ A fever. ? Watch closely for changes in your health, and be sure to contact your doctor if:  ? · You cannot use your shoulder. ? · Your shoulder does not get better as expected. Where can you learn more? Go to http://denise-jeanne.info/. Enter V238 in the search box to learn more about \"Shoulder Pain: Care Instructions. \"  Current as of: March 21, 2017  Content Version: 11.4  © 2328-8904 [employer]. Care instructions adapted under license by [employer] (which disclaims liability or warranty for this information). If you have questions about a medical condition or this instruction, always ask your healthcare professional. [place] [address] any warranty or liability for your use of this information. Contusion: Care Instructions  Your Care Instructions    Contusion is the medical term for a bruise. It is the result of a direct blow or an impact, such as a fall. Contusions are common sports injuries. Most people think of a bruise as a black-and-blue spot. This happens when small blood vessels get torn and leak blood under the skin. But bones, muscles, and organs can also get bruised. This may damage deep tissues but not cause a bruise you can see.   The doctor will do a physical exam to find the location of your contusion. You may also have tests to make sure you do not have a more serious injury, such as a broken bone or nerve damage. These may include X-rays or other imaging tests like a CT scan or MRI. Deep-tissue contusions may cause pain and swelling. But if there is no serious damage, they will often get better in a few weeks with home treatment. The doctor has checked you carefully, but problems can develop later. If you notice any problems or new symptoms, get medical treatment right away. Follow-up care is a key part of your treatment and safety. Be sure to make and go to all appointments, and call your doctor if you are having problems. It's also a good idea to know your test results and keep a list of the medicines you take. How can you care for yourself at home? · Put ice or a cold pack on the sore area for 10 to 20 minutes at a time to stop swelling. Put a thin cloth between the ice pack and your skin. · Be safe with medicines. Read and follow all instructions on the label. ¨ If the doctor gave you a prescription medicine for pain, take it as prescribed. ¨ If you are not taking a prescription pain medicine, ask your doctor if you can take an over-the-counter medicine. · If you can, prop up the sore area on pillows as much as possible for the next few days. Try to keep the sore area above the level of your heart. When should you call for help? Call your doctor now or seek immediate medical care if:  ? · Your pain gets worse. ? · You have new or worse swelling. ? · You have tingling, weakness, or numbness in the area near the contusion. ? · The area near the contusion is cold or pale. ? Watch closely for changes in your health, and be sure to contact your doctor if:  ? · You do not get better as expected. Where can you learn more? Go to http://denise-jeanne.info/. Enter Z514 in the search box to learn more about \"Contusion: Care Instructions. \"  Current as of: March 20, 2017  Content Version: 11.4  © 0907-9211 [employer]. Care instructions adapted under license by [employer] (which disclaims liability or warranty for this information). If you have questions about a medical condition or this instruction, always ask your healthcare professional. [address] any warranty or liability for your use of this information. Motor Vehicle Accident: Care Instructions  Your Care Instructions    You were seen by a doctor after a motor vehicle accident. Because of the accident, you may be sore for several days. Over the next few days, you may hurt more than you did just after the accident. The doctor has checked you carefully, but problems can develop later. If you notice any problems or new symptoms, get medical treatment right away. Follow-up care is a key part of your treatment and safety. Be sure to make and go to all appointments, and call your doctor if you are having problems. It's also a good idea to know your test results and keep a list of the medicines you take. How can you care for yourself at home? · Keep track of any new symptoms or changes in your symptoms. · Take it easy for the next few days, or longer if you are not feeling well. Do not try to do too much. · Put ice or a cold pack on any sore areas for 10 to 20 minutes at a time to stop swelling. Put a thin cloth between the ice pack and your skin. Do this several times a day for the first 2 days. · Be safe with medicines. Take pain medicines exactly as directed. ¨ If the doctor gave you a prescription medicine for pain, take it as prescribed. ¨ If you are not taking a prescription pain medicine, ask your doctor if you can take an over-the-counter medicine. · Do not drive after taking a prescription pain medicine. · Do not do anything that makes the pain worse.   · Do not drink any alcohol for 24 hours or until your doctor tells you it is okay.  When should you call for help? Call 911 if:  ? · You passed out (lost consciousness). ?Call your doctor now or seek immediate medical care if:  ? · You have new or worse belly pain. ? · You have new or worse trouble breathing. ? · You have new or worse head pain. ? · You have new pain, or your pain gets worse. ? · You have new symptoms, such as numbness or vomiting. ? Watch closely for changes in your health, and be sure to contact your doctor if:  ? · You are not getting better as expected. Where can you learn more? Go to http://denise-jeanne.info/. Enter F640 in the search box to learn more about \"Motor Vehicle Accident: Care Instructions. \"  Current as of: March 20, 2017  Content Version: 11.4  © 0333-0678 [employer]. Care instructions adapted under license by [employer] (which disclaims liability or warranty for this information). If you have questions about a medical condition or this instruction, always ask your healthcare professional. [address] any warranty or liability for your use of this information.

## 2022-01-06 ENCOUNTER — TRANSCRIBE ORDER (OUTPATIENT)
Dept: SCHEDULING | Age: 47
End: 2022-01-06

## 2022-01-06 DIAGNOSIS — M25.561 KNEE PAIN, RIGHT: Primary | ICD-10-CM

## 2022-07-13 ENCOUNTER — HOSPITAL ENCOUNTER (OUTPATIENT)
Dept: LAB | Age: 47
Discharge: HOME | End: 2022-07-13

## 2022-07-13 LAB — SENTARA SPECIMEN COL,SENBCF: NORMAL

## 2022-07-13 PROCEDURE — 99001 SPECIMEN HANDLING PT-LAB: CPT

## 2023-05-26 RX ORDER — PREDNISONE 10 MG/1
TABLET ORAL
COMMUNITY
Start: 2018-06-12

## 2023-05-26 RX ORDER — RANITIDINE 150 MG/1
150 TABLET ORAL 2 TIMES DAILY
COMMUNITY

## 2023-05-26 RX ORDER — METHOCARBAMOL 750 MG/1
750 TABLET, FILM COATED ORAL 4 TIMES DAILY
COMMUNITY
Start: 2018-06-12

## 2023-05-26 RX ORDER — NAPROXEN 500 MG/1
500 TABLET ORAL 2 TIMES DAILY PRN
COMMUNITY

## (undated) DEVICE — STERILE POLYISOPRENE POWDER-FREE SURGICAL GLOVES WITH EMOLLIENT COATING: Brand: PROTEXIS

## (undated) DEVICE — SOL IRR STRL H2O 1500ML BTL --

## (undated) DEVICE — 4.5 MM INCISOR PLUS STRAIGHT                                    BLADES, POWER/EP-1, VIOLET, PACKAGED                                    6 PER BOX, STERILE

## (undated) DEVICE — SINGLE PORT MANIFOLD: Brand: NEPTUNE 2

## (undated) DEVICE — SOLUTION IRRIG 3000ML LAC R FLX CONT

## (undated) DEVICE — TUBING PMP L8FT LNG W/ CONN FOR AR-6400 REDEUCE

## (undated) DEVICE — SUTURE ETHLN SZ 4-0 L18IN NONABSORBABLE BLK L19MM PS-2 3/8 1667H

## (undated) DEVICE — STERILE POLYISOPRENE POWDER-FREE SURGICAL GLOVES: Brand: PROTEXIS

## (undated) DEVICE — DEVON™ KNEE AND BODY STRAP 60" X 3" (1.5 M X 7.6 CM): Brand: DEVON

## (undated) DEVICE — SOL IRRIGATION INJ NACL 0.9% 500ML BTL

## (undated) DEVICE — (D)PREP SKN CHLRAPRP APPL 26ML -- CONVERT TO ITEM 371833

## (undated) DEVICE — DISPOSABLE TOURNIQUET CUFF SINGLE BLADDER, SINGLE PORT AND QUICK CONNECT CONNECTOR: Brand: COLOR CUFF

## (undated) DEVICE — KNEE ARTHROSCOPY III-LF: Brand: MEDLINE INDUSTRIES, INC.

## (undated) DEVICE — TUBE IRRIG L8IN LNG PT W/ CONN FOR PMP SYS REDEUCE